# Patient Record
Sex: MALE | Race: BLACK OR AFRICAN AMERICAN | Employment: UNEMPLOYED | URBAN - METROPOLITAN AREA
[De-identification: names, ages, dates, MRNs, and addresses within clinical notes are randomized per-mention and may not be internally consistent; named-entity substitution may affect disease eponyms.]

---

## 2021-12-21 ENCOUNTER — HOSPITAL ENCOUNTER (OUTPATIENT)
Facility: CLINIC | Age: 51
Setting detail: OBSERVATION
Discharge: HOME | End: 2021-12-22
Attending: EMERGENCY MEDICINE | Admitting: INTERNAL MEDICINE

## 2021-12-21 ENCOUNTER — APPOINTMENT (OUTPATIENT)
Dept: GENERAL RADIOLOGY | Facility: CLINIC | Age: 51
End: 2021-12-21
Attending: EMERGENCY MEDICINE

## 2021-12-21 DIAGNOSIS — R07.9 CHEST PAIN, UNSPECIFIED TYPE: ICD-10-CM

## 2021-12-21 LAB
BASOPHILS # BLD AUTO: 0 10E3/UL (ref 0–0.2)
BASOPHILS NFR BLD AUTO: 0 %
EOSINOPHIL # BLD AUTO: 0.1 10E3/UL (ref 0–0.7)
EOSINOPHIL NFR BLD AUTO: 1 %
ERYTHROCYTE [DISTWIDTH] IN BLOOD BY AUTOMATED COUNT: 15.1 % (ref 10–15)
HCT VFR BLD AUTO: 38.5 % (ref 40–53)
HGB BLD-MCNC: 12.3 G/DL (ref 13.3–17.7)
IMM GRANULOCYTES # BLD: 0.1 10E3/UL
IMM GRANULOCYTES NFR BLD: 1 %
LYMPHOCYTES # BLD AUTO: 3.3 10E3/UL (ref 0.8–5.3)
LYMPHOCYTES NFR BLD AUTO: 24 %
MCH RBC QN AUTO: 26.9 PG (ref 26.5–33)
MCHC RBC AUTO-ENTMCNC: 31.9 G/DL (ref 31.5–36.5)
MCV RBC AUTO: 84 FL (ref 78–100)
MONOCYTES # BLD AUTO: 1 10E3/UL (ref 0–1.3)
MONOCYTES NFR BLD AUTO: 7 %
NEUTROPHILS # BLD AUTO: 9.3 10E3/UL (ref 1.6–8.3)
NEUTROPHILS NFR BLD AUTO: 67 %
NRBC # BLD AUTO: 0 10E3/UL
NRBC BLD AUTO-RTO: 0 /100
PLATELET # BLD AUTO: 225 10E3/UL (ref 150–450)
RBC # BLD AUTO: 4.57 10E6/UL (ref 4.4–5.9)
WBC # BLD AUTO: 13.8 10E3/UL (ref 4–11)

## 2021-12-21 PROCEDURE — 85025 COMPLETE CBC W/AUTO DIFF WBC: CPT | Performed by: EMERGENCY MEDICINE

## 2021-12-21 PROCEDURE — 96374 THER/PROPH/DIAG INJ IV PUSH: CPT

## 2021-12-21 PROCEDURE — 84484 ASSAY OF TROPONIN QUANT: CPT | Performed by: EMERGENCY MEDICINE

## 2021-12-21 PROCEDURE — 99285 EMERGENCY DEPT VISIT HI MDM: CPT | Mod: 25

## 2021-12-21 PROCEDURE — 80053 COMPREHEN METABOLIC PANEL: CPT | Performed by: EMERGENCY MEDICINE

## 2021-12-21 PROCEDURE — 83690 ASSAY OF LIPASE: CPT | Performed by: EMERGENCY MEDICINE

## 2021-12-21 PROCEDURE — 250N000013 HC RX MED GY IP 250 OP 250 PS 637: Performed by: EMERGENCY MEDICINE

## 2021-12-21 PROCEDURE — 93005 ELECTROCARDIOGRAM TRACING: CPT

## 2021-12-21 PROCEDURE — 250N000011 HC RX IP 250 OP 636: Performed by: EMERGENCY MEDICINE

## 2021-12-21 PROCEDURE — C9803 HOPD COVID-19 SPEC COLLECT: HCPCS

## 2021-12-21 PROCEDURE — 87635 SARS-COV-2 COVID-19 AMP PRB: CPT | Performed by: EMERGENCY MEDICINE

## 2021-12-21 PROCEDURE — 71046 X-RAY EXAM CHEST 2 VIEWS: CPT

## 2021-12-21 PROCEDURE — 36415 COLL VENOUS BLD VENIPUNCTURE: CPT | Performed by: EMERGENCY MEDICINE

## 2021-12-21 PROCEDURE — 96376 TX/PRO/DX INJ SAME DRUG ADON: CPT

## 2021-12-21 RX ORDER — MORPHINE SULFATE 4 MG/ML
4 INJECTION, SOLUTION INTRAMUSCULAR; INTRAVENOUS
Status: COMPLETED | OUTPATIENT
Start: 2021-12-21 | End: 2021-12-22

## 2021-12-21 RX ORDER — NITROGLYCERIN 0.4 MG/1
0.4 TABLET SUBLINGUAL EVERY 5 MIN PRN
Status: DISCONTINUED | OUTPATIENT
Start: 2021-12-21 | End: 2021-12-22 | Stop reason: HOSPADM

## 2021-12-21 RX ADMIN — NITROGLYCERIN 0.4 MG: 0.4 TABLET SUBLINGUAL at 23:33

## 2021-12-21 RX ADMIN — MORPHINE SULFATE 4 MG: 4 INJECTION INTRAVENOUS at 23:36

## 2021-12-21 ASSESSMENT — ENCOUNTER SYMPTOMS
BACK PAIN: 0
ABDOMINAL PAIN: 0
FEVER: 0
SHORTNESS OF BREATH: 0

## 2021-12-21 ASSESSMENT — MIFFLIN-ST. JEOR: SCORE: 1598.82

## 2021-12-22 ENCOUNTER — APPOINTMENT (OUTPATIENT)
Dept: CARDIOLOGY | Facility: CLINIC | Age: 51
End: 2021-12-22
Attending: INTERNAL MEDICINE

## 2021-12-22 ENCOUNTER — APPOINTMENT (OUTPATIENT)
Dept: NUCLEAR MEDICINE | Facility: CLINIC | Age: 51
End: 2021-12-22
Attending: INTERNAL MEDICINE

## 2021-12-22 VITALS
TEMPERATURE: 97.4 F | RESPIRATION RATE: 14 BRPM | OXYGEN SATURATION: 98 % | WEIGHT: 165 LBS | HEIGHT: 69 IN | DIASTOLIC BLOOD PRESSURE: 104 MMHG | HEART RATE: 103 BPM | SYSTOLIC BLOOD PRESSURE: 152 MMHG | BODY MASS INDEX: 24.44 KG/M2

## 2021-12-22 PROBLEM — R07.9 CHEST PAIN, UNSPECIFIED TYPE: Status: ACTIVE | Noted: 2021-12-22

## 2021-12-22 LAB
ALBUMIN SERPL-MCNC: 3.1 G/DL (ref 3.4–5)
ALP SERPL-CCNC: 81 U/L (ref 40–150)
ALT SERPL W P-5'-P-CCNC: 19 U/L (ref 0–70)
ANION GAP SERPL CALCULATED.3IONS-SCNC: 9 MMOL/L (ref 3–14)
AST SERPL W P-5'-P-CCNC: 19 U/L (ref 0–45)
ATRIAL RATE - MUSE: 109 BPM
BILIRUB SERPL-MCNC: 0.2 MG/DL (ref 0.2–1.3)
BUN SERPL-MCNC: 17 MG/DL (ref 7–30)
CALCIUM SERPL-MCNC: 7.8 MG/DL (ref 8.5–10.1)
CHLORIDE BLD-SCNC: 109 MMOL/L (ref 94–109)
CO2 SERPL-SCNC: 24 MMOL/L (ref 20–32)
CREAT SERPL-MCNC: 1.11 MG/DL (ref 0.66–1.25)
CV BLOOD PRESSURE: 60 MMHG
CV STRESS MAX HR HE: 97
D DIMER PPP FEU-MCNC: 0.35 UG/ML FEU (ref 0–0.5)
DIASTOLIC BLOOD PRESSURE - MUSE: NORMAL MMHG
GFR SERPL CREATININE-BSD FRML MDRD: 81 ML/MIN/1.73M2
GLUCOSE BLD-MCNC: 92 MG/DL (ref 70–99)
INR PPP: 0.94 (ref 0.85–1.15)
INTERPRETATION ECG - MUSE: NORMAL
LIPASE SERPL-CCNC: 117 U/L (ref 73–393)
P AXIS - MUSE: 64 DEGREES
POTASSIUM BLD-SCNC: 3.7 MMOL/L (ref 3.4–5.3)
PR INTERVAL - MUSE: 160 MS
PROT SERPL-MCNC: 7.4 G/DL (ref 6.8–8.8)
QRS DURATION - MUSE: 100 MS
QT - MUSE: 346 MS
QTC - MUSE: 465 MS
R AXIS - MUSE: 83 DEGREES
RATE PRESSURE PRODUCT: NORMAL
SARS-COV-2 RNA RESP QL NAA+PROBE: NEGATIVE
SODIUM SERPL-SCNC: 142 MMOL/L (ref 133–144)
STRESS ECHO BASELINE DIASTOLIC HE: 109
STRESS ECHO BASELINE HR: 85 BPM
STRESS ECHO BASELINE SYSTOLIC BP: 157
STRESS ECHO CALCULATED PERCENT HR: 57 %
STRESS ECHO LAST STRESS DIASTOLIC BP: 100
STRESS ECHO LAST STRESS SYSTOLIC BP: 158
STRESS ECHO TARGET HR: 170
SYSTOLIC BLOOD PRESSURE - MUSE: NORMAL MMHG
T AXIS - MUSE: 63 DEGREES
TROPONIN I SERPL HS-MCNC: 10 NG/L
TROPONIN I SERPL HS-MCNC: 8 NG/L
VENTRICULAR RATE- MUSE: 109 BPM

## 2021-12-22 PROCEDURE — 85610 PROTHROMBIN TIME: CPT | Performed by: INTERNAL MEDICINE

## 2021-12-22 PROCEDURE — 93005 ELECTROCARDIOGRAM TRACING: CPT

## 2021-12-22 PROCEDURE — 250N000011 HC RX IP 250 OP 636: Performed by: INTERNAL MEDICINE

## 2021-12-22 PROCEDURE — 343N000001 HC RX 343: Performed by: INTERNAL MEDICINE

## 2021-12-22 PROCEDURE — 85379 FIBRIN DEGRADATION QUANT: CPT | Performed by: INTERNAL MEDICINE

## 2021-12-22 PROCEDURE — 36415 COLL VENOUS BLD VENIPUNCTURE: CPT | Performed by: INTERNAL MEDICINE

## 2021-12-22 PROCEDURE — 99220 PR INITIAL OBSERVATION CARE,LEVEL III: CPT | Performed by: INTERNAL MEDICINE

## 2021-12-22 PROCEDURE — 78452 HT MUSCLE IMAGE SPECT MULT: CPT

## 2021-12-22 PROCEDURE — 84484 ASSAY OF TROPONIN QUANT: CPT | Performed by: INTERNAL MEDICINE

## 2021-12-22 PROCEDURE — 99207 PR CONSULT E&M CHANGED TO INITIAL LEVEL: CPT | Performed by: PSYCHIATRY & NEUROLOGY

## 2021-12-22 PROCEDURE — G0378 HOSPITAL OBSERVATION PER HR: HCPCS

## 2021-12-22 PROCEDURE — A9502 TC99M TETROFOSMIN: HCPCS | Performed by: INTERNAL MEDICINE

## 2021-12-22 PROCEDURE — 78452 HT MUSCLE IMAGE SPECT MULT: CPT | Mod: 26 | Performed by: INTERNAL MEDICINE

## 2021-12-22 PROCEDURE — 250N000013 HC RX MED GY IP 250 OP 250 PS 637: Performed by: INTERNAL MEDICINE

## 2021-12-22 PROCEDURE — 93017 CV STRESS TEST TRACING ONLY: CPT

## 2021-12-22 PROCEDURE — 999N000049 HC STATISTIC ECHO STRESS OR NM NPI

## 2021-12-22 PROCEDURE — 96376 TX/PRO/DX INJ SAME DRUG ADON: CPT | Mod: 59

## 2021-12-22 PROCEDURE — 99203 OFFICE O/P NEW LOW 30 MIN: CPT | Performed by: PSYCHIATRY & NEUROLOGY

## 2021-12-22 PROCEDURE — 93018 CV STRESS TEST I&R ONLY: CPT | Performed by: INTERNAL MEDICINE

## 2021-12-22 PROCEDURE — 93016 CV STRESS TEST SUPVJ ONLY: CPT | Performed by: INTERNAL MEDICINE

## 2021-12-22 PROCEDURE — 250N000011 HC RX IP 250 OP 636: Performed by: EMERGENCY MEDICINE

## 2021-12-22 RX ORDER — ACYCLOVIR 200 MG/1
0-1 CAPSULE ORAL
Status: DISCONTINUED | OUTPATIENT
Start: 2021-12-22 | End: 2021-12-22

## 2021-12-22 RX ORDER — LIDOCAINE 40 MG/G
CREAM TOPICAL
Status: DISCONTINUED | OUTPATIENT
Start: 2021-12-22 | End: 2021-12-22 | Stop reason: HOSPADM

## 2021-12-22 RX ORDER — ASPIRIN 81 MG/1
81 TABLET ORAL DAILY
Status: DISCONTINUED | OUTPATIENT
Start: 2021-12-22 | End: 2021-12-22 | Stop reason: HOSPADM

## 2021-12-22 RX ORDER — NITROGLYCERIN 0.4 MG/1
0.4 TABLET SUBLINGUAL EVERY 5 MIN PRN
Status: DISCONTINUED | OUTPATIENT
Start: 2021-12-22 | End: 2021-12-22

## 2021-12-22 RX ORDER — LORAZEPAM 2 MG/ML
2 INJECTION INTRAMUSCULAR
Status: DISCONTINUED | OUTPATIENT
Start: 2021-12-22 | End: 2021-12-22 | Stop reason: HOSPADM

## 2021-12-22 RX ORDER — AMINOPHYLLINE 25 MG/ML
50-100 INJECTION, SOLUTION INTRAVENOUS
Status: DISCONTINUED | OUTPATIENT
Start: 2021-12-22 | End: 2021-12-22

## 2021-12-22 RX ORDER — REGADENOSON 0.08 MG/ML
0.4 INJECTION, SOLUTION INTRAVENOUS ONCE
Status: COMPLETED | OUTPATIENT
Start: 2021-12-22 | End: 2021-12-22

## 2021-12-22 RX ORDER — NALOXONE HYDROCHLORIDE 0.4 MG/ML
0.4 INJECTION, SOLUTION INTRAMUSCULAR; INTRAVENOUS; SUBCUTANEOUS
Status: DISCONTINUED | OUTPATIENT
Start: 2021-12-22 | End: 2021-12-22 | Stop reason: HOSPADM

## 2021-12-22 RX ORDER — NALOXONE HYDROCHLORIDE 0.4 MG/ML
0.2 INJECTION, SOLUTION INTRAMUSCULAR; INTRAVENOUS; SUBCUTANEOUS
Status: DISCONTINUED | OUTPATIENT
Start: 2021-12-22 | End: 2021-12-22 | Stop reason: HOSPADM

## 2021-12-22 RX ORDER — ALBUTEROL SULFATE 90 UG/1
2 AEROSOL, METERED RESPIRATORY (INHALATION) EVERY 5 MIN PRN
Status: DISCONTINUED | OUTPATIENT
Start: 2021-12-22 | End: 2021-12-22

## 2021-12-22 RX ORDER — MAGNESIUM HYDROXIDE/ALUMINUM HYDROXICE/SIMETHICONE 120; 1200; 1200 MG/30ML; MG/30ML; MG/30ML
30 SUSPENSION ORAL EVERY 4 HOURS PRN
Status: DISCONTINUED | OUTPATIENT
Start: 2021-12-22 | End: 2021-12-22 | Stop reason: HOSPADM

## 2021-12-22 RX ORDER — MORPHINE SULFATE 2 MG/ML
2 INJECTION, SOLUTION INTRAMUSCULAR; INTRAVENOUS
Status: DISCONTINUED | OUTPATIENT
Start: 2021-12-22 | End: 2021-12-22 | Stop reason: HOSPADM

## 2021-12-22 RX ORDER — CAFFEINE CITRATE 20 MG/ML
60 SOLUTION INTRAVENOUS
Status: DISCONTINUED | OUTPATIENT
Start: 2021-12-22 | End: 2021-12-22

## 2021-12-22 RX ADMIN — TETROFOSMIN 10.5 MCI.: 1.38 INJECTION, POWDER, LYOPHILIZED, FOR SOLUTION INTRAVENOUS at 07:55

## 2021-12-22 RX ADMIN — MORPHINE SULFATE 2 MG: 2 INJECTION, SOLUTION INTRAMUSCULAR; INTRAVENOUS at 08:15

## 2021-12-22 RX ADMIN — ASPIRIN 81 MG: 81 TABLET, COATED ORAL at 08:01

## 2021-12-22 RX ADMIN — REGADENOSON 0.4 MG: 0.08 INJECTION, SOLUTION INTRAVENOUS at 10:17

## 2021-12-22 RX ADMIN — MORPHINE SULFATE 2 MG: 2 INJECTION, SOLUTION INTRAMUSCULAR; INTRAVENOUS at 04:29

## 2021-12-22 RX ADMIN — MORPHINE SULFATE 4 MG: 4 INJECTION INTRAVENOUS at 01:47

## 2021-12-22 RX ADMIN — TETROFOSMIN 30.7 MCI.: 1.38 INJECTION, POWDER, LYOPHILIZED, FOR SOLUTION INTRAVENOUS at 10:20

## 2021-12-22 NOTE — PHARMACY-ADMISSION MEDICATION HISTORY
"Pharmacy Medication History  Admission medication history interview status for the 2021  admission is complete. See EPIC admission navigator for prior to admission medications     Location of Interview: Patient room  Medication history sources: Patient (no surescripts or Care Everywhere information)     Significant changes made to the medication list:  None     In the past week, patient estimated taking medication this percent of the time: less than 50% due to cost    Additional medication history information:   Patient states when he relocated from [place] \"7 years ago\" he never established insurance coverage to continue his prescription medications. When asked to clarify, he stated that he had not been taking these medications periodically in that time period, rather that he had not taken any in 7 years. Medications patient stated he was taking previously:   -amlodipine  -lisinopril  -hydrochlorothiazide   -carvedilol  -plavix  -coumadin  -lipitor  -aspirin  -multivitamin   Patient has bottle of  sublingual nitroglycerin at home; did not add to PTA med list.     Medication reconciliation completed by provider prior to medication history? No    Time spent in this activity: 15 minutes    Prior to Admission medications    Not on File       The information provided in this note is only as accurate as the sources available at the time of update(s)   Es Chahal, TeenaD    "

## 2021-12-22 NOTE — PROGRESS NOTES
SPIRITUAL HEALTH SERVICES  SPIRITUAL ASSESSMENT Progress Note  [facility] UNIT [facility]    REFERRAL SOURCE: Patient request at admission      Introductory visit with Mathew, who did not recall requesting a  and declined a visit at this time. He did request to see a . I advised him I would pass this along. I oriented to  availability and how to request support during hospital stay.      PLAN: I advised station 66 LAMAR Renee of patient's request. [facility] Services remains available for patient, family, and staff support.     Please page the on call  by placing a STAT or ASAP Epic referral for Spiritual Health (which will roll to the on call pager).        CATRINA Scales.   Associate   Pager [phone]

## 2021-12-22 NOTE — PLAN OF CARE
Leaving AMA at this time. Refusing to sign paperwork. MD Dr. Hernandez aware of situation. Security at bedside and will escort him off the unit.

## 2021-12-22 NOTE — PROVIDER NOTIFICATION
"MD Notification    Notified Person: MD    Notified Person Name: Sim Hernandez     Notification Date/Time: 12/22/21 at 1045    Notification Interaction: [employer]     Purpose of Notification: \"Stress test completed. Can he have a diet? Thx!\"    Orders Received: Low Saturated Fat diet ordered     Comments:      "

## 2021-12-22 NOTE — ED NOTES
[facility]  ED Nurse Handoff Report    ED Chief complaint: Chest Pain      ED Diagnosis:   Final diagnoses:   Chest pain, unspecified type       Code Status: Full Code    Allergies:   Allergies   Allergen Reactions     Toradol [Ketorolac Tromethamine] Rash     Fentanyl Rash     Tylenol [Acetaminophen] Rash       Patient Story: Patient BIBA with left sided chest pain that radiates down left arm.  Hx of angina/bypass surgery, and 3x heart attacks.  Focused Assessment:    Labs Ordered and Resulted from Time of ED Arrival to Time of ED Departure   COMPREHENSIVE METABOLIC PANEL - Abnormal       Result Value    Sodium 142      Potassium 3.7      Chloride 109      Carbon Dioxide (CO2) 24      Anion Gap 9      Urea Nitrogen 17      Creatinine 1.11      Calcium 7.8 (*)     Glucose 92      Alkaline Phosphatase 81      AST 19      ALT 19      Protein Total 7.4      Albumin 3.1 (*)     Bilirubin Total 0.2      GFR Estimate 81     CBC WITH PLATELETS AND DIFFERENTIAL - Abnormal    WBC Count 13.8 (*)     RBC Count 4.57      Hemoglobin 12.3 (*)     Hematocrit 38.5 (*)     MCV 84      MCH 26.9      MCHC 31.9      RDW 15.1 (*)     Platelet Count 225      % Neutrophils 67      % Lymphocytes 24      % Monocytes 7      % Eosinophils 1      % Basophils 0      % Immature Granulocytes 1      NRBCs per 100 WBC 0      Absolute Neutrophils 9.3 (*)     Absolute Lymphocytes 3.3      Absolute Monocytes 1.0      Absolute Eosinophils 0.1      Absolute Basophils 0.0      Absolute Immature Granulocytes 0.1      Absolute NRBCs 0.0     TROPONIN I - Normal    Troponin I High Sensitivity 8     LIPASE - Normal    Lipase 117     COVID-19 VIRUS (CORONAVIRUS) BY PCR - Normal    SARS CoV2 PCR Negative       Chest XR,  PA & LAT   Final Result   IMPRESSION: Median sternotomy wires. Otherwise negative chest.            Treatments and/or interventions provided: EKG, morphine, nitro  Patient's response to treatments and/or interventions:     To  "be done/followed up on inpatient unit:      Does this patient have any cognitive concerns?: None    Activity level - Baseline/Home:  Independent  Activity Level - Current:   Independent    Patient's Preferred language: English   Needed?: No    Isolation: None  Infection: Not Applicable  Patient tested for COVID 19 prior to admission: YES  Bariatric?: No    Vital Signs:   Vitals:    12/21/21 2320 12/21/21 2325 12/21/21 2335 12/21/21 2337   BP: (!) 145/111  137/89    Pulse: 116 120 120    Resp: 27 15 11    Temp:    97.8  F (36.6  C)   TempSrc:    Oral   SpO2:  100% 100%    Weight:    74.8 kg (165 lb)   Height:    1.753 m (5' 9\")       Cardiac Rhythm:     Was the PSS-3 completed:   Yes  What interventions are required if any?               Family Comments:   OBS brochure/video discussed/provided to patient/family: Yes              Name of person given brochure if not patient:               Relationship to patient:     For the majority of the shift this patient's behavior was Green.   Behavioral interventions performed were none.    ED NURSE PHONE NUMBER: *[id number]         "

## 2021-12-22 NOTE — PROGRESS NOTES
RECEIVING UNIT ED HANDOFF REVIEW    ED Nurse Handoff Report was reviewed by: Emily Meese, RN on December 22, 2021 at 2:40 AM

## 2021-12-22 NOTE — CONSULTS
Care Management Initial Consult  Long discussion with patient regarding plan of care and discharge plans. Patient stating he is very unhappy being in the hospital at present because he feels he is not being fed and would like to eat what he wants. Discussed that he is on a cardiac diet at present hence may have some restrictions .  Writer introduced my role and assistance with discharge planning as patient is homeless . Offered resources for shelter and may qualify for a medical bed. Also offered assistance with medication at discharge.   Patient states he does not need any help and he does have a place to stay if needed. Patient declined medications. Patient states he will continue taking Cannabis and Heroin. Writer discussed the complications of street drugs. Patient refused to listen and asked writer to leave the room. Patient has decided to leave against medical advice. Writer offered financial counselor services to assist with  Insurance and patient declined.  General Information  Assessment completed with: Patient,    Type of CM/SW Visit: Offer D/C Planning    Primary Care Provider verified and updated as needed: No (Refuses)   Readmission within the last 30 days:        Reason for Consult: discharge planning  Advance Care Planning:            Communication Assessment  Patient's communication style: spoken language (English or Bilingual)    Hearing Difficulty or Deaf: no   Wear Glasses or Blind: no    Cognitive  Cognitive/Neuro/Behavioral: WDL                      Living Environment:   People in home: alone     Current living Arrangements: homeless      Able to return to prior arrangements: yes       Family/Social Support:  Care provided by: self  Provides care for: no one  Marital Status: Single  None          Description of Support System:           Current Resources:   Patient receiving home care services: No     Community Resources:    Equipment currently used at home:    Supplies currently used at home:       Employment/Financial:  Employment Status: unemployed        Financial Concerns: unable to afford medication(s),unable to afford rent/mortgage   Referral to Financial Counselor: No (patient refused)       Lifestyle & Psychosocial Needs:  Social Determinants of Health     Tobacco Use: High Risk     Smoking Tobacco Use: Former Smoker     Smokeless Tobacco Use: Current User   Alcohol Use: Not on file   Financial Resource Strain: Not on file   Food Insecurity: Not on file   Transportation Needs: Not on file   Physical Activity: Not on file   Stress: Not on file   Social Connections: Not on file   Intimate Partner Violence: Not on file   Depression: Not on file   Housing Stability: Not on file       Functional Status:  Prior to admission patient needed assistance:   Dependent ADLs:: Independent          Mental Health Status:  Mental Health Status: Current Concern  Mental Health Management: Individual Therapy    Chemical Dependency Status:  Chemical Dependency Status: Current Concern  Chemical Dependency Management: Other (see comment) (Refused)          Values/Beliefs:  Spiritual, Cultural Beliefs, Gnosticist Practices, Values that affect care: no               Care Management Discharge Note    Discharge Date: 12/22/2021       Discharge Disposition:  Homeless    Discharge Services:  None    Discharge DME:  None    Discharge Transportation:Bus      Private pay costs discussed: Not applicable         Education Provided on the Discharge Plan:  yes  Persons Notified of Discharge Plans:  Patient/Family in Agreement with the Plan:  Yes    Handoff Referral Completed: No    Rosmery Wagner RN  Inpatient Care Coordinator  [facility]  # [phone]      Rosmery Wagner RN

## 2021-12-22 NOTE — ED PROVIDER NOTES
"  History     Chief Complaint:  Chest Pain       The history is provided by the patient and the EMS personnel.      Mathew Kuhn is a 51 year old male who presents with chest pain.  Patient presents by EMS.  He notes that he was at [employer] pushing his shopping cart when 1 hour prior to arrival he developed severe left-sided chest pain with radiation to his left arm/neck/jaw.  Patient reports that he took 4 sublingual nitro with and without improvement.  On arrival EMS provided 324 mg aspirin.  Patient notes that his pain is currently 10/10.  He does endorse previous history of triple bypass as well as stent placement.  Patient reports that he is not been taking any medications for the last 7 years and is currently homeless.  He is a former smoker and continues to chew tobacco.  Patient notes that he is from [place] and does not have a cardiologist in the [facility].    ROS:  Review of Systems   Constitutional: Negative for fever.   Respiratory: Negative for shortness of breath.    Cardiovascular: Positive for chest pain.   Gastrointestinal: Negative for abdominal pain.   Musculoskeletal: Negative for back pain.   All other systems reviewed and are negative.    Allergies:  Toradol [Ketorolac Tromethamine]  Fentanyl  Tylenol [Acetaminophen]     Medications:    Plavix   Aspirin     Past Medical History:    The patient denies any significant past medical history.    Surgical History:  Triple Bypass  Heart stent placement     Social History:  Former smoker, chews tobacco  PCP: No primary care provider on file.     Physical Exam     Patient Vitals for the past 24 hrs:   BP Temp Temp src Pulse Resp SpO2 Height Weight   12/21/21 2337 -- 97.8  F (36.6  C) Oral -- -- -- 1.753 m (5' 9\") 74.8 kg (165 lb)   12/21/21 2335 137/89 -- -- 120 11 100 % -- --   12/21/21 2325 -- -- -- 120 15 100 % -- --   12/21/21 2320 (!) 145/111 -- -- 116 27 -- -- --        Physical Exam  General: Alert and cooperative with exam. Patient in moderate " distress. Normal mentation.  Head:  Scalp is NC/AT  Eyes:  No scleral icterus, PERRL  ENT:  The external nose and ears are normal.  Neck:  Normal range of motion without rigidity.  CV:  Regular rate and rhythm    No pathologic murmur   Resp:  Breath sounds are clear bilaterally    Non-labored, no retractions or accessory muscle use  GI:  Abdomen is soft, no distension, no tenderness. No peritoneal signs  MS:  No lower extremity edema   Skin:  Warm and dry, No rash or lesions noted.  Well-healed midline sternal scar  Neuro: Oriented x 3. No gross motor deficits.    Emergency Department Course   ECG:  ECG obtained at 2324, ECG read at 2324  Sinus tachycardia with premature atrial complexes. Possible left atrial enlargement. Incomplete right bundle branch block. Anteroseptal infarct, age undetermined. Abnormal ECG.   No prior ECG to compare.   Rate 109 bpm. RI interval 160 ms. QRS duration 100 ms. QT/QTc 346/465 ms. P-R-T axes 64 83 63.     Imaging:  Chest XR,  PA & LAT   Final Result   IMPRESSION: Median sternotomy wires. Otherwise negative chest.         Report per radiology    Laboratory:  Labs Ordered and Resulted from Time of ED Arrival to Time of ED Departure   COMPREHENSIVE METABOLIC PANEL - Abnormal       Result Value    Sodium 142      Potassium 3.7      Chloride 109      Carbon Dioxide (CO2) 24      Anion Gap 9      Urea Nitrogen 17      Creatinine 1.11      Calcium 7.8 (*)     Glucose 92      Alkaline Phosphatase 81      AST 19      ALT 19      Protein Total 7.4      Albumin 3.1 (*)     Bilirubin Total 0.2      GFR Estimate 81     CBC WITH PLATELETS AND DIFFERENTIAL - Abnormal    WBC Count 13.8 (*)     RBC Count 4.57      Hemoglobin 12.3 (*)     Hematocrit 38.5 (*)     MCV 84      MCH 26.9      MCHC 31.9      RDW 15.1 (*)     Platelet Count 225      % Neutrophils 67      % Lymphocytes 24      % Monocytes 7      % Eosinophils 1      % Basophils 0      % Immature Granulocytes 1      NRBCs per 100 WBC 0       Absolute Neutrophils 9.3 (*)     Absolute Lymphocytes 3.3      Absolute Monocytes 1.0      Absolute Eosinophils 0.1      Absolute Basophils 0.0      Absolute Immature Granulocytes 0.1      Absolute NRBCs 0.0     TROPONIN I - Normal    Troponin I High Sensitivity 8     LIPASE - Normal    Lipase 117     COVID-19 VIRUS (CORONAVIRUS) BY PCR - Normal    SARS CoV2 PCR Negative            Emergency Department Course:  Reviewed:  I reviewed nursing notes, vitals and past medical history    Assessments:  2325 I obtained history and examined the patient as noted above.   0032 I rechecked the patient and explained findings.     Interventions:  Medications   nitroGLYcerin (NITROSTAT) sublingual tablet 0.4 mg (0.4 mg Sublingual Given 12/21/21 2333)   morphine (PF) injection 4 mg (4 mg Intravenous Given 12/21/21 2336)        Disposition:  The patient was admitted to the observation unit under the care of Dr. Veras.     Impression & Plan      Covid-19  Mathew Kuhn was evaluated during a global COVID-19 pandemic, which necessitated consideration that the patient might be at risk for infection with the SARS-CoV-2 virus that causes COVID-19.   Applicable protocols for evaluation were followed during the patient's care.   COVID-19 was considered as part of the patient's evaluation.    Medical Decision Making:  Patient is a 51-year-old male with reported extensive cardiac history presents with sudden onset chest pain beginning 1 hour prior to arrival.  Patient's medical history and records were reviewed.  Initial consideration for, but not limited to, ACS/MI, esophageal spasm/GERD, MSK pain, infectious process, among others.  Labs, EKG, and imaging was obtained.  EKG demonstrates sinus tachycardia with PACs and incomplete right bundle branch block as well as anterior septal Q waves; no previous for comparison.  Patient received 324 mg aspirin from EMS.  He was provided nitro without improvement in the ED and later morphine with noted  improvement in pain.  Labs and chest x-ray without significant findings.  Given patient's cardiac history and recent onset of chest pain, he will be admitted to chest pain observation unit with the hospitalist service for further evaluation and care.  Additionally patient notes he is homeless and is noncompliant with previously prescribed medication; would benefit from social work consultation.  Patient remained stable throughout my care.      Diagnosis:    ICD-10-CM    1. Chest pain, unspecified type  R07.9            Garret Zaragoza,   12/22/21 0949

## 2021-12-22 NOTE — PROGRESS NOTES
Observation Goals:       Serial troponins and stress test complete. Not Met. Trops negative, Stress test pending.    Seen and cleared by consultant if applicable. Not Met. Psych consult pending     Adequate pain control on oral analgesia. Not met. 10/10 chest pain this am. Stat EKG NSR with PAC's. Morphine given x1.    Vital signs normal or at patient baseline. Met.     Safe disposition plan has been identified. Not Met. SW/CM consult pending.     Nurse to notify provider when observation goals have been met and patient is ready for discharge.

## 2021-12-22 NOTE — PROGRESS NOTES
Lexiscan Stress: Pt tolerated well. VSS. Pt CO L) sided chest pain, pressure, left shoulder and jaw pain rated 5/10 prior to injection.   After injection C/O SOB, increase in chest pain/pressure, shoulder and jaw pain rated 7/10. Emesis x1.           1035 Pt transferred back to Rm 601-2 per w/c.  SOB resolved.  Chest pain/pressure, left shoulder and jaw pain back to baseline.  Detailed report given to Raiza VENEGAS.

## 2021-12-22 NOTE — H&P
[facility]    History and Physical - Hospitalist Service       Date of Admission:  12/21/2021    Assessment & Plan      Mathew Kuhn is a 51 year old male admitted on 12/21/2021. He presents to the emergency department with left-sided chest pain radiating to shoulder and jaw while ambulating with a shopping cart.  New onset pain, does have a history of coronary artery bypass grafting and is not on medication secondary to homelessness and no insurance.    Left-sided chest pain:  Coronary artery disease: Patient reports a history of three-vessel coronary artery bypass grafting approximately 4 to 7 years ago at [facility] in [place].  Prior to this, he tells me that he had stents placed.  He reports a total of 4 heart attacks historically.  He is uncertain of any vessels involved.  He is on no medications, by his estimate, for 7 years.  Tells me that he last filled a nitroglycerin prescription four years ago.  Initial troponin undetectable.  Right bundle branch block on EKG, left atrial enlargement  Aortic valve replacement: Patient reports aortic valve, bioprosthetic, placed at time of coronary artery bypass grafting.  He cannot provide further details of this.  -Complete troponin trend  -Cardiac telemetry  -Lipid panel for a.m; anticipate resumption of atorvastatin pending lipid panel, though will be of limited utility unless patient is able to receive assistance with insurance as he does not fill his medications secondary to homelessness and finances.  -Awaiting records from [facility] regarding prior coronary artery disease and coronary artery bypass grafting, aortic valve replacement  -Add on D-dimer, INR; patient reports that he has a history of DVTs and PEs for which he is supposed to be on Coumadin.  Does have some pleuritic component to his chest discomfort, and if D-dimer is elevated, will pursue PE CT study  -Aspirin 81 mg daily  -TTE  -Exercise nuclear medicine  test ordered pending negative troponin trend.  Patient tells me that he was not able to perform a treadmill stress historically secondary to low heart rate, though would attempt this first    DVT and PE history: Patient reports that following his coronary artery bypass grafting he had blood clots in both of his legs as well as his lungs.  Tells me that he was supposed to be on Coumadin, though discontinued this several years ago after leaving hospital in [place].  He is not certain how long he was to remain on anticoagulation.  -Add on D-dimer as above  -Add on INR  -Await outside medical records    Tobacco use disorder: Former smoker.  Reports he currently uses chewing tobacco at approximately 1 tin per every week and a half.  Flecks of chewing tobacco on bed linens in the emergency department.  Offered nicotine replacement therapy, patient declines.  -Continue to encourage smoking cessation/tobacco cessation     Epilepsy: Patient reports a history of seizures for which he has been on Keppra in the past.  Tells me his last seizure was a week ago.  Prior medication was Keppra, though has not taken in years.  -Seizure precautions with as needed Ativan for breakthrough seizure  -Awaiting [facility] records regarding prior evaluations.    Homelessness: Has long-term homelessness;  -Care management consult as patient reports he does not take any medications secondary to lack of insurance; he has not enrolled or attempted to enroll in Medicaid    Marijuana use disorder: Patient utilizes marijuana approximately every week or every other week.  Reports no prior history of stimulant use  -Urine drug screen pending    Paranoia, question schizoaffective disorder: Patient is alert, conversant, appropriate conversation, though when discussing Medicaid enrollment in the setting of his homelessness, appears to have some underlying paranoia.  Patient tells me that he has not sought support from the government because of  "\"the free Masons patrolling.\"  Tells me that they are monitoring him in the government as well as here in this hospital.  Patient reports no prior history of mental illness or prior diagnosis of schizoaffective disorder or schizophrenia.  -Care management consult placed  -Low threshold for psychiatry consultation if there is concern that mental illness may be interfering with patient's ability to receive adequate medical care       Diet:   Regular adult diet as tolerated (no caffeine, cardiac diet)  DVT Prophylaxis: Pneumatic Compression Devices  Jacobs Catheter: Not present  Central Lines: None  Code Status:    Full code.  Confirmed with patient on admission    Clinically Significant Risk Factors Present on Admission                   Disposition Plan   Expected Discharge:  likely 12/22/2021  Anticipated discharge location:  Awaiting care coordination huddle  Delays:     Homelessness, lack of insurance coverage       The patient's care was discussed with the Patient and Dr. Zaragoza in the emergency department.  Left-sided chest pain radiating to left shoulder and jaw  [facility]  Securely message with the Vocera Web Console (learn more here)  Text page via [email or web address] Paging/Directory        ______________________________________________________________________    Chief Complaint   Left-sided chest pain radiating to left shoulder and jaw    History is obtained from patient, chart review, discussion with Dr. Zaragoza in the emergency department.  Patient's outside records from [facility] are not yet available, though a release of information is being sent.  When completed and care everywhere ID is provided, records should be accessible through Care Everywhere    History of Present Illness   Mathew Kuhn is a 51 year old male who has a history of coronary artery bypass grafting and reported aortic valve replacement at [facility] somewhere between 3 and 7 years ago who presents " to the emergency department after he developed left-sided chest discomfort radiating to his shoulder and jaw while ambulating with a shopping cart around 11 PM tonight.    Patient is homeless.  He tells me that he spends his time moving about the country, though largely between [place] and here in [place].  He tells me that he also has been in [place] and [place].  He has difficulty telling me exact dates, even has some difficulty with general dates, though reports that he has been here in [place] without leaving for the past 3 years.  He has never sought medical care here in [place] by his report.    Patient has a midline sternal incision with sternal wires on chest x-ray.  He reports he had coronary artery bypass graft being in [place].  When asked when, he initially tells me this was 3 to 5 years ago, later tells me it was probably closer to 7 years ago.  He cannot recall what vessels were involved, though it is reported as a 3 vessel bypass with aortic valve replacement.  He tells me that he had blood clots in his legs and lungs and was prescribed Coumadin at that time, though did not take when he was discharged from the hospital.  He also tells me he was supposed to be on Plavix and atorvastatin.  Patient reports he has not been on medications for the past 7 years.  This said, he tells me that he filled a prescription for nitroglycerin 4 years ago, and carries this with him.  Tonight with onset of chest discomfort he took 4 tablets of his  nitroglycerin and experienced no benefit.  Patient tells me he is not on medications because he has no insurance coverage.  He has not applied for Medicaid or sought additional assistance.  When inquiring as to why, he describes concerns with free Masons patrolling within the government and here at the hospital, and this concern has led him to avoid government assistance.    Patient tells me that his mother lives here in a diner, she  approximately 3  weeks ago.  Patient vaguely describes awaiting a check from a  associated with her estate, and when he receives this, will be returning to [place].    Patient tells me his prior medications included Plavix, Coumadin, atorvastatin.     Patient reports he had a headache secondary to nitroglycerin administration, requests more IV morphine as he found this to be palliative and improved his chest discomfort from a 10 to a 7.      Patient reports he has had similar pain with his prior heart attacks.  Tells me that he had a total of 3 heart attacks with stenting prior to bypass grafting.  No recent change in lower extremity swelling.  Tells me that he has had this since his heart surgery and DVTs.  He is uncertain how long he was to remain on anticoagulation for his DVTs as he did not continue this medication after his surgery.    Review of Systems    The 10 point Review of Systems is negative other than noted in the HPI or here.   No fevers or chills  Chest pain worsened with deep inspiration (same site)    Past Medical History    I have reviewed this patient's medical history and updated it with pertinent information if needed.   Hypertension  Hyperlipidemia  Coronary artery disease  Aortic valve replacement  Tobacco use disorder  Homelessness  Patient reported history of DVTs and pulmonary emboli following bypass grafting    Past Surgical History   I have reviewed this patient's surgical history and updated it with pertinent information if needed.  Reports three-vessel coronary artery bypass grafting approximately 7 years ago at [facility] in [place]; aortic valve replacement at the same time    Social History   I have reviewed this patient's social history and updated it with pertinent information if needed.  Social History     Tobacco Use     Smoking status: Former Smoker     Smokeless tobacco: Current User.  Reports 1 tin every week and a half   Substance Use Topics     Alcohol use: Yes, occasionally.   "Maybe twice per week     Drug use:  Reports marijuana approximately once every other week       Family History     Patient reports no siblings.  Tells me that his mother  approximately 3 weeks ago at age 67; lived here in [place].  He tells me that autopsy reported \"natural causes,\" though he has suspicion of foul play.    Prior to Admission Medications   None.  Tells me that he has not taken medications for the past 7 years, though tonight took 4 sublingual nitroglycerin which he believes were approximately 4 years old.     Allergies   Allergies   Allergen Reactions     Toradol [Ketorolac Tromethamine] Rash     Fentanyl Rash     Tylenol [Acetaminophen] Rash       Physical Exam   Vital Signs: Temp: 97.8  F (36.6  C) Temp src: Oral BP: 137/89 Pulse: 120   Resp: 11 SpO2: 100 %      Weight: 165 lbs 0 oz    General Appearance: Generally well-appearing 51-year-old male laying on [facility].  He does not appear toxic or in acute distress  Eyes: Mild scleral injection bilaterally, no scleral icterus  HEENT: Normocephalic, atraumatic  Respiratory: Breath sounds are clear bilateral auscultation without wheezes or crackles.  Good air movement throughout lung fields.  Reports pain in his central chest with deep inspiration  Cardiovascular: No appreciable murmur or mechanical click.  Heart rate mildly tachycardic at 100.  Occasional PVCs/bigeminy appreciated on auscultation  GI: Abdomen soft, nontender to palpation.  No palpable mass.  Lymph/Hematologic: 1+ bilateral lower extremity edema.  Patient reports this is longstanding since heart surgery  Genitourinary: Not examined  Skin: Multiple tattoos, no jaundice, no petechiae.  Scars from prior injuries on lower extremities including what appears to be prior vein harvest.  Musculoskeletal: Muscular tone and bulk intact in all extremities.  Appropriate for age.  Neurologic: Patient is alert, conversant, generally appropriate in conversation.  Has difficulty recalling " general time courses of his prior medical cares, though these, by his report, were several years ago  Psychiatric: Pleasant, normal affect.  When discussing his insurance coverage, however, patient exhibits evidence of paranoia as above.    Data   Data reviewed today: I reviewed all medications, new labs and imaging results over the last 24 hours. I personally reviewed the chest x-ray image(s) showing Clear lungs, prior sternotomy.  EKG with right bundle branch block, left atrial enlargement.    Recent Labs   Lab 12/21/21  2343   WBC 13.8*   HGB 12.3*   MCV 84         POTASSIUM 3.7   CHLORIDE 109   CO2 24   BUN 17   CR 1.11   ANIONGAP 9   AKANKSHA 7.8*   GLC 92   ALBUMIN 3.1*   PROTTOTAL 7.4   BILITOTAL 0.2   ALKPHOS 81   ALT 19   AST 19   LIPASE 117

## 2021-12-22 NOTE — CONSULTS
"Consult Date: 12/22/2021    PSYCHIATRY CONSULTATION    REQUESTING PHYSICIAN:  Dr. Hernandez    REASON FOR CONSULTATION:  Paranoia.    IDENTIFYING DATA:  The patient is a 51-year-old -American male who is homeless, presenting to the Emergency Room with left-sided chest pain radiating to his shoulder.  He has a history of coronary artery disease and aortic valve replacement.  He was admitted to the medical floor for further stabilization.  He has known homelessness, a long history of marijuana use, and epilepsy.  He has been on Keppra in the past.  He apparently made some paranoid statements about the government and \"the Freemasons patrolling.\"    CHIEF COMPLAINT:  \"No, I don't think I am paranoid.\"    HISTORY OF PRESENT ILLNESS:  The patient is a 51-year-old -American male presenting with the above history.  He is apparently homeless and has cardiac issues.  He typically does not seek good medical followup.  He came in with chest pain.  He uses marijuana on a fairly regular basis.  He says he rarely uses alcohol.  During the course of his initial assessment, he seemed to be exhibiting some mild paranoia, making comments about \"free Freemasons.\"  He is guarded with me, but pleasant.  He is not overtly agitated and says he sleeps fine.  He acknowledges his marijuana use and minimizes any notion that he has ever been treated for psychosis or diagnosed with a condition such as schizophrenia.  He says he has been riding the [employer].  He supports himself by going to food [employer].  This is really his first contact in the [employer] system.  Care Everywhere did not provide us any additional information.    On further questioning, he is not endorsing any overt symptoms of psychosis, other than a subtle hint of paranoia.  He denies tahira, generalized anxiety, panic, OCD, eating disorder history, trauma history, ADHD or current concerns about depression.  He was quite pleasant with me, but does have a guarded demeanor.  " "He was watching TV when I came into the room.  He did answer my questions appropriately.  I do not have a urine drug screen available for review.    PAST PSYCHIATRIC HISTORY:  As above.    PAST CHEMICAL DEPENDENCY HISTORY:  Suspected abuse of cannabis.  Some use of alcohol, which is not well defined, but he admits to this.  He does not admit to any previous treatment.    PAST MEDICAL HISTORY:  Per chart review includes atherosclerotic coronary vascular disease with a history of aortic valve replacement, epilepsy, and chest pain this admission.    PRIOR TO ADMISSION MEDICATIONS:  None.    FAMILY HISTORY:  He denies mental illness, chemical dependency or suicide.    SOCIAL HISTORY:  The patient is single, does not have children.  I believe he is originally from [place] and has been in [place] for about 7 years.  He has a high school degree.  He has been sustaining himself by reading the [employer] and going to homeless shelters and also getting food from food [employer].  He does not acknowledge having any siblings.  He denies legal or  history.  He also denies mental illness in his family.    REVIEW OF SYSTEMS:  A 10-point review of systems notable for complaints of chest pain on cardiovascular exam.    MOST RECENT VITAL SIGNS:  Blood pressure 152/104, temperature 97.4, pulse 103, respiratory rate 14, oxygen saturation 99%.    MENTAL STATUS EXAMINATION:  Appearance:  The patient is a well-nourished gentleman, sitting in bed.  He is somewhat guarded with me, but pleasant.  Judged to be a marginal historian.  Speech is decreased in prosody.  Rate and flow normal.  Use of language appropriate.  Motor exam is calm.  Coordination, station and gait within normal limits.  Muscle strength and tone adequate.  Affect slightly blunted.  Mood \"okay.\"  Thought process logical, coherent and goal-directed.  No loosening of associations, flight of ideas or formal thought disorder.  Thought content negative for " "hallucinations, delusions, suicidal or homicidal ideation.  There seems to be a hint of paranoia as he has a rather guarded demeanor and reluctance to answer questions.  He did make comments about \"Freemasons\" in the community.  Insight and judgment fair.  Cognitive:  The patient is alert and oriented x3.  Concentration intact.  Recent and remote memory intact.  General fund of knowledge average.    IMPRESSION:  The patient is a 51-year-old gentleman presenting with chest pain.  He likely has some underlying paranoid features, but currently he is not interested in taking medication.  He says he is sleeping fine, minimizes his symptoms.  There is no danger to self or others.    DIAGNOSES:     1.  Unspecified psychotic disorder.  2.  Cannabis use disorder.  3.  History of atherosclerotic coronary vascular disease with current complaints of chest pain.  4.  History of aortic valve replacement.    PLAN:     1.  Medical management as you are.  2.  It would be appropriate to obviously connect him with Medical Services upon discharge.  3.  He is declining any psychiatric intervention at this time, though there is not any viable option to force treatment upon him.  He has some subtle paranoia, but he is not demonstrating dangerousness to self or others.  He can be discharged when deemed medically stable.    Taras Amato MD        D: 2021   T: 2021   MT: [id number]    Name:     DEANNE EVERETT  MRN:      [id number]        Account:      [id number]   :      1970           Consult Date: 2021     Document: [id number]  "

## 2021-12-22 NOTE — PROGRESS NOTES
"Patient very noncompliant with plan of care. Refusing to speak with Doctor David and nursing staff. Calling out every few minutes and upset with cardiac diet. Repeating statement \"I'm not a child.\" Refusing to listen to staff trying to explain medical situation and requesting bedside leave the room.   "

## 2021-12-22 NOTE — PROVIDER NOTIFICATION
Brief update:    Per ER HUC report, [facility] has no record of patient following release of information.    Nicholas Veras MD  6:13 AM

## 2021-12-24 LAB
ATRIAL RATE - MUSE: 96 BPM
DIASTOLIC BLOOD PRESSURE - MUSE: NORMAL MMHG
INTERPRETATION ECG - MUSE: NORMAL
P AXIS - MUSE: 67 DEGREES
PR INTERVAL - MUSE: 150 MS
QRS DURATION - MUSE: 92 MS
QT - MUSE: 398 MS
QTC - MUSE: 502 MS
R AXIS - MUSE: 90 DEGREES
SYSTOLIC BLOOD PRESSURE - MUSE: NORMAL MMHG
T AXIS - MUSE: 75 DEGREES
VENTRICULAR RATE- MUSE: 96 BPM
